# Patient Record
Sex: MALE | Race: WHITE | NOT HISPANIC OR LATINO | Employment: FULL TIME | URBAN - METROPOLITAN AREA
[De-identification: names, ages, dates, MRNs, and addresses within clinical notes are randomized per-mention and may not be internally consistent; named-entity substitution may affect disease eponyms.]

---

## 2018-03-23 ENCOUNTER — OFFICE VISIT (OUTPATIENT)
Dept: SLEEP CENTER | Facility: CLINIC | Age: 38
End: 2018-03-23

## 2018-03-23 VITALS
SYSTOLIC BLOOD PRESSURE: 110 MMHG | BODY MASS INDEX: 31.24 KG/M2 | WEIGHT: 270 LBS | DIASTOLIC BLOOD PRESSURE: 72 MMHG | HEIGHT: 78 IN | HEART RATE: 72 BPM

## 2018-03-23 DIAGNOSIS — G47.00 INSOMNIA, UNSPECIFIED TYPE: ICD-10-CM

## 2018-03-23 DIAGNOSIS — E66.9 OBESITY (BMI 30-39.9): ICD-10-CM

## 2018-03-23 DIAGNOSIS — G47.33 OSA (OBSTRUCTIVE SLEEP APNEA): Primary | ICD-10-CM

## 2018-03-23 DIAGNOSIS — F41.9 ANXIETY: ICD-10-CM

## 2018-03-23 DIAGNOSIS — J30.89 CHRONIC NON-SEASONAL ALLERGIC RHINITIS, UNSPECIFIED TRIGGER: ICD-10-CM

## 2018-03-23 DIAGNOSIS — G47.10 HYPERSOMNIA: ICD-10-CM

## 2018-03-23 NOTE — PROGRESS NOTES
Review of Systems      Genitourinary frequent urination, frequent urination at night and difficulity emptying your bladder and none   Cardiology none   Gastrointestinal none   Neurology headaches   Constitutional none   Integumentary none   Psychiatry anxiety and irritability   Musculoskeletal back pain   Pulmonary none   ENT nasal or sinus congestion, post nasal drip and throat clearing   Endocrine none   Hematological none

## 2018-03-23 NOTE — PROGRESS NOTES
Follow-up Note - Sleep Center   Dean Cobian  44 y o  male  :1980  [id number]    I saw Samir Estrada in sleep clinic today for his sleep disordered breathing, coexisting sleep complaints & medical conditions  [Patient] had a home sleep study in 2016 but did not follow up  He is here to review results and further options  The study demonstrated a EMMA (respiratory event index of) 7/hour  Minimum oxygen saturation was 88%   The snore index was 5 5%  Medications, Past, Family, Social History were reviewed  [Interval changes notable for escalation of his symptoms]     He  has no past medical history on file  He currently has no medications in their medication list     ROS: reviewed see attached  [& significant for ongoing nasal congestion and postnasal drip  He also reports anxiety ]  HPI:  He is snoring has gotten much worse and disturbs his wife to the extent that he now has to sleep in another room  He is not aware of breathing difficulties during sleep  He reported no features of movement disorder of parasomnia  Sleep Routine:  He is spending 7-1/2 hours in bed and typically falls asleep in less than 20 minutes  Sleep is interrupted 2-3 times a night  He awakens with the aid of an alarm and is not rested  He reports constant fatigue and has excessive drowsiness  [He rated himself at 20 /24 on the [place] sleepiness scale ]        On Exam: Vitals are stable: Blood pressure 110/72, pulse 72, height 6' 6" (1 981 m), weight 122 kg (270 lb)  Body mass index is 31 2 kg/m²  Shelvy [place]ck Circumference: 39cm  Patient is [well groomed] alert, orientated, cooperative [and in no distress]  Mental state [appeared normal]  [  ] Physical findings are essentially unchanged from previous  IMPRESSION:    1  OSCAR (obstructive sleep apnea)  Cpap DME   2  Insomnia, unspecified type     3  Chronic non-seasonal allergic rhinitis, unspecified trigger     4  Hypersomnia     5  Obesity (BMI 30-39  9) 6  Anxiety         PLAN:    1  I reviewed results of the Sleep study with the patient  2  With respect to above conditions, I counseled on pathophysiology, diagnosis, treatment options, risks and benefits; inter-relationship and effects on symptoms and comorbidities; risks of no treatment; costs and insurance aspects  3  I also advised on weight reduction  4  Patient elected positive airway pressure therapy and wanted to try auto titrating Pap  I gave him a prescription to start with pressures between 5 and 15 cm H2O     5  Follow-up to be scheduled in 2 months to monitor progress and compliance          Thank you for allowing me to participate in the care of this patient  I will keep you apprised of developments               Sincerely,    Janny Mejia MD  Board Certified Specialist

## 2018-03-29 ENCOUNTER — HOSPITAL ENCOUNTER (OUTPATIENT)
Dept: SLEEP CENTER | Facility: CLINIC | Age: 38
Discharge: HOME | End: 2018-03-29

## 2018-05-25 ENCOUNTER — TELEPHONE (OUTPATIENT)
Dept: SLEEP CENTER | Facility: CLINIC | Age: 38
End: 2018-05-25

## 2018-05-25 ENCOUNTER — OFFICE VISIT (OUTPATIENT)
Dept: SLEEP CENTER | Facility: CLINIC | Age: 38
End: 2018-05-25

## 2018-05-25 VITALS
DIASTOLIC BLOOD PRESSURE: 78 MMHG | WEIGHT: 267 LBS | HEIGHT: 78 IN | BODY MASS INDEX: 30.89 KG/M2 | SYSTOLIC BLOOD PRESSURE: 120 MMHG | HEART RATE: 84 BPM

## 2018-05-25 DIAGNOSIS — F41.9 ANXIETY: ICD-10-CM

## 2018-05-25 DIAGNOSIS — J31.0 CHRONIC RHINITIS: ICD-10-CM

## 2018-05-25 DIAGNOSIS — E66.9 OBESITY (BMI 30-39.9): ICD-10-CM

## 2018-05-25 DIAGNOSIS — G47.00 INSOMNIA, UNSPECIFIED TYPE: ICD-10-CM

## 2018-05-25 DIAGNOSIS — G47.33 OSA (OBSTRUCTIVE SLEEP APNEA): Primary | ICD-10-CM

## 2018-05-25 NOTE — PROGRESS NOTES
Follow-Up Note - Sleep Center   Alice Anderson  43 y o  male  :1980  QV    CC: I saw this patient for follow-up in clinic today for his Sleep Disordered Breathing, Coexisting Sleep and Medical Problems  PFSH, Problem List, Medications & Allergies were reviewed in EMR  Interval changes: [none reported ]   He  has no past medical history on file  He currently has no medications in their medication list     ROS: Reviewed (see attached)  He has had some intentional weight loss  He continues to report sinus symptoms, daily headaches musculoskeletal aches and pain  He feels jittery  HPI:  With respect to sleep disordered breathing, compliance data download shows: [ using PAP > 4 hours per night 3 3% of the time and AHI is 8/hour at [90th percentile] pressure of 8 4cm H2O  ]   Feng Means reports  ·  significant difficulty tolerating PAP;   · some adverse effects; nasal congesetion, mask discomfort, mask dislodges during sleep and claustrophobia  · no  benefits from use ; is unable to adjust and he felt this he in did sleep  Sleep Routine:  He reports getting 7 hours sleep ; he  has difficulty initiating and maintaining sleep   He attributes in part to racing thoughts  He awakens spontaneously feeling unrefreshed  He has excessive drowsiness he struggles with driving and would doze off if sedentary  [facility] rated himself at Total score: 16 /24 on the [place] sleepiness scale ]      EXAM: Vitals are stable: /78   Pulse 84   Ht 6' 6" (1 981 m)   Wt 121 kg (267 lb)   BMI 30 85 kg/m²   Neck Circumference: 44 cm  Patient is alert, orientated, cooperative [and in no distress]  Mental state [appears normal]  There are [no] facial pressure marks or rashes  [  ] Physical findings are essentially unchanged as documented in the initial encounter  IMPRESSION:     1  OSCAR (obstructive sleep apnea)     2  Insomnia, unspecified type     3  Chronic rhinitis     4  Obesity (BMI 30-39 9)     5  Anxiety       PLAN:  1  I reviewed results of the Sleep studies with the patient  2  I discussed treatment options with risks and benefits  3  Treatment with  PAP is medically necessary and Howie Speak is agreable to continue use  4  Pressure setting:  Continue at 8-12 cm H2O     5  Instruction on care of equipment and strategies to improve comfort with use of PAP were discussed [:controlling mucosal dryness, nasal symptoms and Mask leaks]  6  Care coordinated with DME provider and prescription to replace supplies as needed was provided  7  Need for compliance with therapy and weight reduction were emphasized  8  Cognitive behavioral therapy was initiated, Sleep Hygiene and behavioral techniques to manage Insomnia were discussed  9  For the nasal symptoms he is using regular nasal saline rinse  I advised adding topical nasal steroid and over-the-counter antihistamine if necessary  10  He is due to see his dentist and will also discuss the mandibular advancement device  11  With your consent, follow-up is advised in 1 month  [to monitor progress, compliance and to adjust therapy]  Thank you for allowing me to participate in the care of this patient      Sincerely,    Authenticated electronically by Berta Lezama MD on 46/60/94   Board Certified Specialist

## 2018-05-25 NOTE — PROGRESS NOTES
Review of Systems      Genitourinary need to urinate more than twice a night and difficulty with erection   Cardiology palpitations/fluttering feeling in the chest   Gastrointestinal none   Neurology frequent headaches   Constitutional claustrophobia and fatigue   Integumentary none   Psychiatry anxiety, depression and mood change   Musculoskeletal joint pain, muscle aches, back pain, sciatica and leg cramps   Pulmonary shortness of breath with activity and snoring   ENT throat clearing   Endocrine none   Hematological none

## 2018-07-19 ENCOUNTER — TELEPHONE (OUTPATIENT)
Dept: SLEEP CENTER | Facility: CLINIC | Age: 38
End: 2018-07-19